# Patient Record
Sex: MALE | Race: WHITE | NOT HISPANIC OR LATINO | URBAN - METROPOLITAN AREA
[De-identification: names, ages, dates, MRNs, and addresses within clinical notes are randomized per-mention and may not be internally consistent; named-entity substitution may affect disease eponyms.]

---

## 2022-04-25 ENCOUNTER — OFFICE (OUTPATIENT)
Dept: URBAN - METROPOLITAN AREA CLINIC 102 | Facility: CLINIC | Age: 42
End: 2022-04-25

## 2022-04-25 VITALS
HEIGHT: 75 IN | WEIGHT: 272 LBS | SYSTOLIC BLOOD PRESSURE: 157 MMHG | TEMPERATURE: 96.5 F | HEART RATE: 78 BPM | DIASTOLIC BLOOD PRESSURE: 100 MMHG

## 2022-04-25 DIAGNOSIS — Z72.89 OTHER PROBLEMS RELATED TO LIFESTYLE: ICD-10-CM

## 2022-04-25 DIAGNOSIS — K22.70 BARRETT'S ESOPHAGUS WITHOUT DYSPLASIA: ICD-10-CM

## 2022-04-25 DIAGNOSIS — Z86.19 PERSONAL HISTORY OF OTHER INFECTIOUS AND PARASITIC DISEASES: ICD-10-CM

## 2022-04-25 DIAGNOSIS — K21.9 GASTRO-ESOPHAGEAL REFLUX DISEASE WITHOUT ESOPHAGITIS: ICD-10-CM

## 2022-04-25 PROCEDURE — 99204 OFFICE O/P NEW MOD 45 MIN: CPT | Performed by: PHYSICIAN ASSISTANT

## 2022-04-25 NOTE — SERVICEHPINOTES
[Mr. Snow is here to discuss hepatitis C. He was informed of this dx in 2002. He has never been treated for hep C. He tells me he was vaccinated for hepatitis B. 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 He was dx with Arriaga's esophagus by [facility] about 5-6 yrs ago no EGD since then. He takes 40 mg of Nexium daily which controls his heartburn. No upper GI symptoms such as nausea, vomiting, dysphagia, abdominal pain, weight loss, etc. No signs of bleeding like black stool or blood in his stool. He drinks 1-3 beers per day. He doesn't take any herbal supplementation. No family hx of liver disease. He has never been told that he has cirrhosis of the liver. No known heart issues.

## 2022-05-09 ENCOUNTER — OFFICE (OUTPATIENT)
Dept: URBAN - METROPOLITAN AREA CLINIC 102 | Facility: CLINIC | Age: 42
End: 2022-05-09

## 2022-05-09 DIAGNOSIS — Z86.19 PERSONAL HISTORY OF OTHER INFECTIOUS AND PARASITIC DISEASES: ICD-10-CM

## 2022-05-09 PROCEDURE — 91200 LIVER ELASTOGRAPHY: CPT | Performed by: INTERNAL MEDICINE

## 2022-05-25 ENCOUNTER — OFFICE (OUTPATIENT)
Dept: URBAN - METROPOLITAN AREA CLINIC 79 | Facility: CLINIC | Age: 42
End: 2022-05-25
Payer: COMMERCIAL

## 2022-05-25 DIAGNOSIS — Z86.19 PERSONAL HISTORY OF OTHER INFECTIOUS AND PARASITIC DISEASES: ICD-10-CM

## 2022-05-25 PROCEDURE — 90471 IMMUNIZATION ADMIN: CPT | Performed by: INTERNAL MEDICINE

## 2022-05-25 PROCEDURE — 90746 HEPB VACCINE 3 DOSE ADULT IM: CPT | Performed by: INTERNAL MEDICINE

## 2022-07-29 ENCOUNTER — OFFICE (OUTPATIENT)
Dept: URBAN - METROPOLITAN AREA CLINIC 79 | Facility: CLINIC | Age: 42
End: 2022-07-29
Payer: COMMERCIAL

## 2022-07-29 DIAGNOSIS — B18.2 CHRONIC VIRAL HEPATITIS C: ICD-10-CM

## 2022-07-29 PROCEDURE — 90471 IMMUNIZATION ADMIN: CPT | Performed by: INTERNAL MEDICINE

## 2022-07-29 PROCEDURE — 90746 HEPB VACCINE 3 DOSE ADULT IM: CPT | Performed by: INTERNAL MEDICINE

## 2022-09-09 ENCOUNTER — OFFICE (OUTPATIENT)
Dept: URBAN - METROPOLITAN AREA CLINIC 79 | Facility: CLINIC | Age: 42
End: 2022-09-09
Payer: COMMERCIAL

## 2022-09-09 VITALS
HEIGHT: 75 IN | WEIGHT: 289 LBS | SYSTOLIC BLOOD PRESSURE: 131 MMHG | TEMPERATURE: 97.7 F | HEART RATE: 74 BPM | DIASTOLIC BLOOD PRESSURE: 92 MMHG

## 2022-09-09 DIAGNOSIS — K21.9 GASTRO-ESOPHAGEAL REFLUX DISEASE WITHOUT ESOPHAGITIS: ICD-10-CM

## 2022-09-09 DIAGNOSIS — K22.70 BARRETT'S ESOPHAGUS WITHOUT DYSPLASIA: ICD-10-CM

## 2022-09-09 DIAGNOSIS — B18.2 CHRONIC VIRAL HEPATITIS C: ICD-10-CM

## 2022-09-09 PROCEDURE — 99214 OFFICE O/P EST MOD 30 MIN: CPT | Performed by: PHYSICIAN ASSISTANT

## 2022-09-09 NOTE — SERVICEHPINOTES
span style="background-color: rgb(255, [phone])" visited="true"Pt is a 41 yr old male here to f/u to Epclusa use for hepatitis C. He feels well and has no known side effects from the Epclusa. He will be finished with the Epclusa in 2 weeks. /span  His viral load is undetectable and his CBC and CMP are normal (labs from 07/2022). His fibroscan showed F2 and S3 and fibrosure showed F1-F2 fibrosis.  
br
br
EGD 2018 + for Arriaga's esophagus. He is taking Prilosec OTC as he cannot take Nexium 40 mg with the Epclusa. He is taking the medication correctly for taking the Epclusa. His acid reflux is controlled has breakthrough symptoms on average every few weeks. No dysphagia, N/V, anorexia, early satiety, wt loss, black stool, or abdominal pain. No NSAID use. He is mostly ETOH free except for occasionally. 
br
br
No known heart issues.

## 2022-10-11 ENCOUNTER — OFFICE (OUTPATIENT)
Dept: URBAN - METROPOLITAN AREA CLINIC 98 | Facility: CLINIC | Age: 42
End: 2022-10-11
Payer: COMMERCIAL

## 2022-10-11 VITALS
OXYGEN SATURATION: 85 % | DIASTOLIC BLOOD PRESSURE: 82 MMHG | WEIGHT: 289 LBS | TEMPERATURE: 97.9 F | HEART RATE: 72 BPM | RESPIRATION RATE: 28 BRPM | DIASTOLIC BLOOD PRESSURE: 37 MMHG | OXYGEN SATURATION: 93 % | OXYGEN SATURATION: 97 % | SYSTOLIC BLOOD PRESSURE: 80 MMHG | DIASTOLIC BLOOD PRESSURE: 59 MMHG | RESPIRATION RATE: 26 BRPM | RESPIRATION RATE: 16 BRPM | TEMPERATURE: 98.7 F | DIASTOLIC BLOOD PRESSURE: 75 MMHG | HEIGHT: 75 IN | OXYGEN SATURATION: 95 % | HEART RATE: 79 BPM | HEART RATE: 89 BPM | RESPIRATION RATE: 20 BRPM | HEART RATE: 84 BPM | SYSTOLIC BLOOD PRESSURE: 120 MMHG | OXYGEN SATURATION: 98 % | DIASTOLIC BLOOD PRESSURE: 57 MMHG | SYSTOLIC BLOOD PRESSURE: 111 MMHG | SYSTOLIC BLOOD PRESSURE: 96 MMHG | RESPIRATION RATE: 15 BRPM | SYSTOLIC BLOOD PRESSURE: 84 MMHG | DIASTOLIC BLOOD PRESSURE: 48 MMHG

## 2022-10-11 DIAGNOSIS — K22.70 BARRETT'S ESOPHAGUS WITHOUT DYSPLASIA: ICD-10-CM

## 2022-10-11 DIAGNOSIS — K22.89 OTHER SPECIFIED DISEASE OF ESOPHAGUS: ICD-10-CM

## 2022-12-30 ENCOUNTER — OFFICE (OUTPATIENT)
Dept: URBAN - METROPOLITAN AREA CLINIC 79 | Facility: CLINIC | Age: 42
End: 2022-12-30
Payer: COMMERCIAL

## 2022-12-30 DIAGNOSIS — B18.2 CHRONIC VIRAL HEPATITIS C: ICD-10-CM

## 2022-12-30 PROCEDURE — 90746 HEPB VACCINE 3 DOSE ADULT IM: CPT | Performed by: INTERNAL MEDICINE

## 2022-12-30 PROCEDURE — 90471 IMMUNIZATION ADMIN: CPT | Performed by: INTERNAL MEDICINE

## 2023-06-13 ENCOUNTER — TELEHEALTH PROVIDED OTHER THAN IN PATIENT'S HOME (OUTPATIENT)
Dept: URBAN - METROPOLITAN AREA TELEHEALTH 12 | Facility: TELEHEALTH | Age: 43
End: 2023-06-13

## 2023-06-13 VITALS — HEIGHT: 75 IN | WEIGHT: 290 LBS

## 2023-06-13 DIAGNOSIS — K21.9 GASTRO-ESOPHAGEAL REFLUX DISEASE WITHOUT ESOPHAGITIS: ICD-10-CM

## 2023-06-13 DIAGNOSIS — K22.70 BARRETT'S ESOPHAGUS WITHOUT DYSPLASIA: ICD-10-CM

## 2023-06-13 DIAGNOSIS — R19.7 DIARRHEA, UNSPECIFIED: ICD-10-CM

## 2023-06-13 DIAGNOSIS — Z86.19 PERSONAL HISTORY OF OTHER INFECTIOUS AND PARASITIC DISEASES: ICD-10-CM

## 2023-06-13 DIAGNOSIS — Z72.0 TOBACCO USE: ICD-10-CM

## 2023-06-13 DIAGNOSIS — Z72.89 OTHER PROBLEMS RELATED TO LIFESTYLE: ICD-10-CM

## 2023-06-13 PROCEDURE — 99214 OFFICE O/P EST MOD 30 MIN: CPT | Mod: 95 | Performed by: PHYSICIAN ASSISTANT

## 2023-06-13 NOTE — SERVICEHPINOTES
PATIENT VERIFIED BY DATE OF BIRTH AND NAME. Patient has been consented for this telecommunication visit.   Pt is a 42 yr old male here to discuss diarrhea. I also have previously tx him for hep C. He also has Arriaga's esophagus and he is UTD on his EGDs. He has been experiencing diarrhea for the past one month. He is taking pepto on a regular basis to help with the diarrhea. Never had anything like this previously. He now has 3 BMs in the am on average and then has another 3-4 BMs later in the day. Sometimes has nocturnal diarrhea. He has blood per rectum which he attributes to hemorrhoids. No blood in the actual stool. No abdominal pain, nausea, vomiting. No new medications. No new supplements. No recent abx use, travel outside of the country, sick contacts, illicit food triggers. No fever or chills.  No family hx of GI issues. No other GI related complaints today. ROS as per HPI and otherwise is unremarkable.

## 2023-06-23 LAB
C DIFFICILE TOXIN GENE NAA: NEGATIVE
CALPROTECTIN, FECAL: 39 UG/G (ref 0–120)
OVA + PARASITE EXAM: (no result)
PANCREATIC ELASTASE, FECAL: 402 UG ELAST./G (ref 200–?)
RESULT: RESULT 1: (no result)
STOOL CULTURE: CAMPYLOBACTER CULTURE: (no result)
STOOL CULTURE: E COLI SHIGA TOXIN EIA: NEGATIVE
STOOL CULTURE: SALMONELLA/SHIGELLA SCREEN: (no result)

## 2023-07-27 ENCOUNTER — OFFICE (OUTPATIENT)
Dept: URBAN - METROPOLITAN AREA CLINIC 102 | Facility: CLINIC | Age: 43
End: 2023-07-27

## 2023-07-27 PROCEDURE — 00031: CPT | Performed by: INTERNAL MEDICINE

## 2023-08-09 ENCOUNTER — OFFICE (OUTPATIENT)
Dept: URBAN - METROPOLITAN AREA CLINIC 98 | Facility: CLINIC | Age: 43
End: 2023-08-09

## 2023-08-09 VITALS
HEIGHT: 75 IN | RESPIRATION RATE: 45 BRPM | OXYGEN SATURATION: 96 % | RESPIRATION RATE: 16 BRPM | RESPIRATION RATE: 47 BRPM | RESPIRATION RATE: 44 BRPM | DIASTOLIC BLOOD PRESSURE: 78 MMHG | HEART RATE: 72 BPM | HEART RATE: 82 BPM | SYSTOLIC BLOOD PRESSURE: 110 MMHG | HEART RATE: 84 BPM | HEART RATE: 91 BPM | RESPIRATION RATE: 18 BRPM | OXYGEN SATURATION: 90 % | RESPIRATION RATE: 42 BRPM | SYSTOLIC BLOOD PRESSURE: 111 MMHG | TEMPERATURE: 97.9 F | OXYGEN SATURATION: 97 % | SYSTOLIC BLOOD PRESSURE: 130 MMHG | DIASTOLIC BLOOD PRESSURE: 73 MMHG | DIASTOLIC BLOOD PRESSURE: 65 MMHG | RESPIRATION RATE: 26 BRPM | HEART RATE: 92 BPM | DIASTOLIC BLOOD PRESSURE: 75 MMHG | DIASTOLIC BLOOD PRESSURE: 48 MMHG | HEART RATE: 81 BPM | SYSTOLIC BLOOD PRESSURE: 115 MMHG | TEMPERATURE: 99.1 F | TEMPERATURE: 98.1 F | OXYGEN SATURATION: 100 % | SYSTOLIC BLOOD PRESSURE: 119 MMHG

## 2023-08-09 DIAGNOSIS — R19.7 DIARRHEA, UNSPECIFIED: ICD-10-CM

## 2023-08-09 RX ADMIN — Medication 2: at 09:06

## 2023-08-09 NOTE — SERVICEHPINOTES
Pt presents for colonoscopy due to chronic diarrhea. No family history of colon cancer nor IBD. Stool testing negative.

## 2023-08-10 LAB
ANATOMIC PATHOLOGY REPORT: (no result)
PDF REPORT: PDF REPORT1: (no result)

## 2023-09-12 ENCOUNTER — TELEHEALTH PROVIDED OTHER THAN IN PATIENT'S HOME (OUTPATIENT)
Dept: URBAN - METROPOLITAN AREA TELEHEALTH 12 | Facility: TELEHEALTH | Age: 43
End: 2023-09-12

## 2023-09-12 VITALS — HEIGHT: 75 IN | WEIGHT: 290 LBS

## 2023-09-12 DIAGNOSIS — Z86.19 PERSONAL HISTORY OF OTHER INFECTIOUS AND PARASITIC DISEASES: ICD-10-CM

## 2023-09-12 DIAGNOSIS — K22.70 BARRETT'S ESOPHAGUS WITHOUT DYSPLASIA: ICD-10-CM

## 2023-09-12 DIAGNOSIS — R19.7 DIARRHEA, UNSPECIFIED: ICD-10-CM

## 2023-09-12 PROCEDURE — 99214 OFFICE O/P EST MOD 30 MIN: CPT | Mod: 95 | Performed by: PHYSICIAN ASSISTANT

## 2023-09-12 NOTE — SERVICEHPINOTES
PATIENT VERIFIED BY DATE OF BIRTH AND NAME. Patient has been consented for this telecommunication visit.   pt is here for f/u regarding diarrhea. Stool studies were unremarkable. Colonoscopy w/bxs were unremarkable. He has about 3 BMs every am---subsequent BMs are looser.  He hasn't been checked for celiac--will send him an order for this. He gets gas pain at times--No pain prior to a BM that is alleviated by a BM--no cramping but fecal urgency. No weight loss. His gallbladder is in situ--he is ETOH free--no hx of pancreatitis. No loose stool outside of the morning. No nocturnal stool. Overall, much better. No glaucoma. br
manuel
EGD is UTD--due for next one in 2025 for Arriaga's surveillance. 
br
manuel
He has achieved SVR--treated with epclusa in the past. 
br
manuel 
ROS as per HPI and o/w is unremarkable. No other GI related complaints today. manuel jackson

## 2023-09-12 NOTE — SERVICENOTES
Patient's visit was conducted through [employer] video telecommunication. Patient consented before the start of visit as to understanding of privacy concerns, possible technological failure, and their responsibility of carrying out instructions of plan.

## 2023-10-11 ENCOUNTER — TELEHEALTH PROVIDED OTHER THAN IN PATIENT'S HOME (OUTPATIENT)
Dept: URBAN - METROPOLITAN AREA TELEHEALTH 3 | Facility: TELEHEALTH | Age: 43
End: 2023-10-11
Payer: COMMERCIAL

## 2023-10-11 VITALS — HEIGHT: 75 IN | WEIGHT: 290 LBS

## 2023-10-11 DIAGNOSIS — R19.7 DIARRHEA, UNSPECIFIED: ICD-10-CM

## 2023-10-11 PROCEDURE — 99213 OFFICE O/P EST LOW 20 MIN: CPT | Mod: 95 | Performed by: PHYSICIAN ASSISTANT

## 2023-10-11 NOTE — SERVICEHPINOTES
PATIENT VERIFIED BY DATE OF BIRTH AND NAME. Patient has been consented for this telecommunication visit.   Pt is here for f/u. I have been seeing him regarding diarrhea. PMX:   Stool studies were unremarkable. Colonoscopy w/bxs were unremarkable. He has about 3 BMs every am---subsequent BMs are looser. He gets gas pain at times--No pain prior to a BM that is alleviated by a BM--no cramping but fecal urgency. No weight loss. His gallbladder is in situ--he is ETOH free--no hx of pancreatitis. No loose stool outside of the morning. No nocturnal stool. Overall, much better. No glaucoma. He was given antispasmodics to try (dicyclomine) and he was also advised that he could take 1/2 an imodium prn symptoms. He states that the antispasmodics didn't do anything. He was drinking a gallon of water per day and liquids on top of this--like tea, gatorade, etc. Currently, stools are more formed and overall less frequent. He never tried the imodium. He notes that he is thirsty and drinks a lot of liquids. br
br
ros as per hpi and o/w is unremarkable. No other GI related complaints today.

## 2023-10-11 NOTE — SERVICENOTES
Patient's visit was conducted through [employer] video telecommunication. Patient consented before the start of visit as to understanding of privacy concerns, possible technological failure, and their responsibility of carrying out instructions of plan.